# Patient Record
Sex: FEMALE | Race: BLACK OR AFRICAN AMERICAN | NOT HISPANIC OR LATINO | URBAN - METROPOLITAN AREA
[De-identification: names, ages, dates, MRNs, and addresses within clinical notes are randomized per-mention and may not be internally consistent; named-entity substitution may affect disease eponyms.]

---

## 2020-12-01 ENCOUNTER — EMERGENCY (EMERGENCY)
Facility: HOSPITAL | Age: 30
LOS: 1 days | Discharge: LEFT BEFORE DISCHARGE | End: 2020-12-01
Payer: SELF-PAY

## 2020-12-01 VITALS
WEIGHT: 145.06 LBS | HEIGHT: 63 IN | DIASTOLIC BLOOD PRESSURE: 102 MMHG | TEMPERATURE: 99 F | RESPIRATION RATE: 20 BRPM | OXYGEN SATURATION: 100 % | SYSTOLIC BLOOD PRESSURE: 172 MMHG | HEART RATE: 80 BPM

## 2020-12-01 PROCEDURE — L9991: CPT

## 2020-12-01 NOTE — ED ADULT TRIAGE NOTE - CHIEF COMPLAINT QUOTE
Pt. complaining of burning to top of head and associated HTN intermittently x 2 weeks.  Pt. states yesterday burning became constant.  Pt. with history of migraine and htn.  Pt. denies vision changes or dizziness.

## 2021-02-11 NOTE — ED ADULT TRIAGE NOTE - BP NONINVASIVE DIASTOLIC (MM [facility])
· Baseline Cr 1 3-1 4  · Hold further diuresis  · Strict I&Os  · Trend daily BUN/creatinine    · Nephrology consultation  · Maintain [place] [address]